# Patient Record
(demographics unavailable — no encounter records)

---

## 2025-03-24 NOTE — REASON FOR VISIT
[Language Line ] : provided by [employer]   [Initial Eval - Existing Diagnosis] : an initial evaluation of an existing diagnosis

## 2025-03-24 NOTE — PHYSICAL EXAM
[General Appearance - Alert] : alert [Oriented To Time, Place, And Person] : oriented to person, place, and time [FreeTextEntry1] : no facial masking  normal speech tone is normal 0 Resting tremor +1 R>L Action tremor 0 Postural tremor  0 Bradykinesia with finger tapping, hand supination/pronation, foot tapping, foot stomping. No dysmetria with finger to nose Gait: able to stand with hands crossed and without assistance normal posture Normal gait initiation, normal stride length, normal arm swing, no freezing of gait upon turning, normal turns negative pull test  Handwriting is small and untidy but legible.  Spirals have mild waveforms when grasping the pen normally

## 2025-03-24 NOTE — DISCUSSION/SUMMARY
[FreeTextEntry1] : 53-year-old male presents with hand tremors and abnormal writing  since 2018 and has since worsened. His only task he has problems with is handwriting. There are no parkinsonian features on exam and minimal action/postural tremors, which may be masked by high dose primidone. He denies brain fog and daytime sleepiness. No family hx.    Patient was counseled on the following recommendations: obtain brain MRI  Trial of artane 1mg qam x week then increase to 1 full tab. Continue primidone 500mg for now but may need to lower dose in the future if side effects arise   f.u 3 months

## 2025-03-24 NOTE — HISTORY OF PRESENT ILLNESS
[FreeTextEntry1] : 53-year-old RH male presents with reports of hand tremors with action. He states tremors initially presented in 2018 but have progressed since then. He now has difficulty with handwriting.  Patient is taking primidone 250mg qd since 2018. The dose was increased over time, last time it increased was 4 months ago. Patient states the tremors improved with higher doses. Notes it wears off after 10-12 hours, takes it at night. If he forgets he begins to shake. One doctor told him he had writer's cramp His handwriting and pen  was mostly normal prior to developing hand tremors. He now needs to hold the pen in between his 2 middle fingers. Denies issues with eating, drinking or self-care activities.   Balance and walking: No issues with imbalance, denies falls.    Family Hx: No one with hand tremors.  PMHx: DM II, HTN does not drink alcohol  nonmotor Denies issues with sleep, no EDS  Denies RBD  denies ICDs Denies constipation  Reports RLS managed by pramipexole  Current medication  Aspirin 81mg flomax HCTZ 25mg Januvia Metoprolol 50mg Primidone 250mg 2 tabs at bedtime  pramipexole 0.5mg BID  Trulicity  Valsartan  Vitamin B12 Vitamin D3

## 2025-07-27 NOTE — PHYSICAL EXAM
[General Appearance - Alert] : alert [Oriented To Time, Place, And Person] : oriented to person, place, and time [FreeTextEntry1] : no facial masking  normal speech tone is normal 0 Resting tremor +1 R>L Action tremor 0 Postural tremor  0 Bradykinesia with finger tapping, hand supination/pronation, foot tapping, foot stomping. No dysmetria with finger to nose Gait: able to stand with hands crossed and without assistance normal posture Normal gait initiation, normal stride length, normal arm swing, no freezing of gait upon turning, normal turns negative pull test  Handwriting is WNL  Spirals have mild waveforms when grasping the pen normally

## 2025-07-27 NOTE — HISTORY OF PRESENT ILLNESS
[FreeTextEntry1] : 53-year-old RH male presents with reports of hand tremors with action. He states tremors initially presented in 2018 but have progressed since then. He now has difficulty with handwriting.  Patient is taking primidone 250mg qd since 2018. The dose was increased over time, last time it increased was 4 months ago. Patient states the tremors improved with higher doses. Notes it wears off after 10-12 hours, takes it at night. If he forgets he begins to shake. One doctor told him he had writer's cramp His handwriting and pen  was mostly normal prior to developing hand tremors. He now needs to hold the pen in between his 2 middle fingers. Denies issues with eating, drinking or self-care activities.   Balance and walking: No issues with imbalance, denies falls.   7/22/25 Since the last visit he lowered his primidone to 250mg qhs from 500mg. He does not feel much of an impact with the reduction.  States artane made him sleepy, discontinued it. Denies interference with ADLs. He switched to a new job which does not require him to write as much. He is no longer as sleepy as he used to be on higher dose of primidone Patient states he is feeling better overall. Denies balance and gait issues. Denies areas of weakness but does fatigue when walking long distances or taking stairs. Denies any changes in his vision, he has yearly exams with optometry.  Denies numbness and tingling.  Reports occasional leg cramping at night which improved since starting pramipexole.    Family Hx: No one with hand tremors.  PMHx: DM II, HTN does not drink alcohol  nonmotor Denies issues with sleep, no EDS  Denies RBD  denies ICDs Denies constipation  Reports RLS managed by pramipexole  Current medication  Aspirin 81mg flomax HCTZ 25mg Januvia Metoprolol 50mg Primidone 250mg  pramipexole 0.5mg QHS  Trulicity  Valsartan  Vitamin B12 Vitamin D3

## 2025-07-27 NOTE — DISCUSSION/SUMMARY
[FreeTextEntry1] : 54-year-old male with hand tremors and abnormal writing  since 2018. tremors are not bothering him as much. Handwriting is much better. Willing to try to come off some of his primidone if tolerated   Patient was counseled on the following recommendations: titrate primidone to 100mg qhs. Written instructions provided No need to repeat MRI. Patient brought MRI from 2018 with same right parietal lobe flair hyperintensities observed in most recent scan.   F/u 3 months